# Patient Record
Sex: MALE | Race: WHITE | URBAN - METROPOLITAN AREA
[De-identification: names, ages, dates, MRNs, and addresses within clinical notes are randomized per-mention and may not be internally consistent; named-entity substitution may affect disease eponyms.]

---

## 2017-08-13 ENCOUNTER — HOSPITAL ENCOUNTER (EMERGENCY)
Facility: CLINIC | Age: 32
Discharge: HOME | End: 2017-08-13
Attending: NURSE PRACTITIONER | Admitting: NURSE PRACTITIONER

## 2017-08-13 VITALS
SYSTOLIC BLOOD PRESSURE: 142 MMHG | BODY MASS INDEX: 39.2 KG/M2 | OXYGEN SATURATION: 98 % | HEART RATE: 78 BPM | HEIGHT: 71 IN | DIASTOLIC BLOOD PRESSURE: 98 MMHG | WEIGHT: 280 LBS | RESPIRATION RATE: 16 BRPM | TEMPERATURE: 99.2 F

## 2017-08-13 DIAGNOSIS — T14.8XXA PUNCTURE WOUND: ICD-10-CM

## 2017-08-13 PROCEDURE — 99282 EMERGENCY DEPT VISIT SF MDM: CPT

## 2017-08-13 RX ORDER — GINSENG 100 MG
CAPSULE ORAL
Status: DISCONTINUED
Start: 2017-08-13 | End: 2017-08-13 | Stop reason: HOSPADM

## 2017-08-13 ASSESSMENT — ENCOUNTER SYMPTOMS: WOUND: 1

## 2017-08-13 NOTE — ED NOTES
Patient arrives for complaint of a wound to the right upper arm. Patient states he was wrestling with a friend last night at approx 0200 when he landed on a metal object sustaining his injury. States it has been bleeding a small amount since. ABC intact, A&Ox4. Bleeding controlled at this time.

## 2017-08-13 NOTE — ED AVS SNAPSHOT
[facility] Emergency Department    [address]    [facility] [address]    Phone:  [phone]    Fax:  [phone]                                       Marck Hadley   MRN: [id number]    Department:  [facility] Emergency Department   Date of Visit:  8/13/2017           Patient Information     Date Of Birth          1985        Your diagnoses for this visit were:     Puncture wound        You were seen by Natalie Roberts, GUICHO CNP.      Follow-up Information     Follow up with DoctorJai MD In 3 days.      Discharge References/Attachments     PUNCTURE WOUND (GENERAL) (ENGLISH)      24 Hour Appointment Hotline       To make an appointment at any [facility] clinic, call [phone] ([phone]). If you don't have a family doctor or clinic, we will help you find one. [facility] clinics are conveniently located to serve the needs of you and your family.             Review of your medicines      Notice     You have not been prescribed any medications.            Orders Needing Specimen Collection     None      Pending Results     No orders found from 8/11/2017 to 8/14/2017.            Pending Culture Results     No orders found from 8/11/2017 to 8/14/2017.            Pending Results Instructions     If you had any lab results that were not finalized at the time of your Discharge, you can call the ED Lab Result RN at [phone]. You will be contacted by this team for any positive Lab results or changes in treatment. The nurses are available 7 days a week from 10A to 6:30P.  You can leave a message 24 hours per day and they will return your call.        Test Results From Your Hospital Stay               Clinical Quality Measure: Blood Pressure Screening     Your blood pressure was checked while you were in the emergency department today. The last reading we obtained was  BP: (!) 142/98 . Please read the guidelines below about what these numbers mean and what  "you should do about them.  If your systolic blood pressure (the top number) is less than 120 and your diastolic blood pressure (the bottom number) is less than 80, then your blood pressure is normal. There is nothing more that you need to do about it.  If your systolic blood pressure (the top number) is 120-139 or your diastolic blood pressure (the bottom number) is 80-89, your blood pressure may be higher than it should be. You should have your blood pressure rechecked within a year by a primary care provider.  If your systolic blood pressure (the top number) is 140 or greater or your diastolic blood pressure (the bottom number) is 90 or greater, you may have high blood pressure. High blood pressure is treatable, but if left untreated over time it can put you at risk for heart attack, stroke, or kidney failure. You should have your blood pressure rechecked by a primary care provider within the next 4 weeks.  If your provider in the emergency department today gave you specific instructions to follow-up with your doctor or provider even sooner than that, you should follow that instruction and not wait for up to 4 weeks for your follow-up visit.        Thank you for choosing [facility]       Thank you for choosing [facility] for your care. Our goal is always to provide you with excellent care. Hearing back from our patients is one way we can continue to improve our services. Please take a few minutes to complete the written survey that you may receive in the mail after you visit with us. Thank you!        [employer] Information     [facility] lets you send messages to your doctor, view your test results, renew your prescriptions, schedule appointments and more. To sign up, go to [email or web address] . Click on \"Log in\" on the left side of the screen, which will take you to the Welcome page. Then click on \"Sign up Now\" on the right side of the page.     You will be asked to enter the access code listed below, as well as some " personal information. Please follow the directions to create your username and password.     Your access code is: [email or web address]  Expires: 2017  4:10 PM     Your access code will  in 90 days. If you need help or a new code, please call your [facility] clinic or [phone].        Care EveryWhere ID     This is your Care EveryWhere ID. This could be used by other organizations to access your [facility] medical records  [id number]        Equal Access to Services     SOCORRO PANCHAL : Brit rao Soradha, waaxjas luqadaha, qaybta kaalmada mary ellen, heber lopez . So [facility] [phone].    ATENCIÓN: Si habla español, tiene a del cid disposición servicios gratuitos de asistencia lingüística. Llame al [phone].    We comply with applicable federal civil rights laws and [place] laws. We do not discriminate on the basis of race, color, national origin, age, disability sex, sexual orientation or gender identity.            After Visit Summary       This is your record. Keep this with you and show to your community pharmacist(s) and doctor(s) at your next visit.

## 2017-08-13 NOTE — ED PROVIDER NOTES
"  History     Chief Complaint:  Puncture Wound    HPI   Marck Hadley is a 31 year old male who presents to the emergency department today for evaluation of puncture wound to his right upper arm. The patient was wrestling around 0200 this morning which resulted in a puncture wound caused by a chisel tool. He did clean the wound and has had it covered with bandages. He notes that the wound has not stopped bleeding over the last 12 hours. Not on any blood thinners. Tetanus is up to date.    Allergies:  Toradol  Tramadol      Medications:    The patient is currently on no regular medications.      Past Medical History:    History reviewed. No pertinent past medical history.     Past Surgical History:    Umbilical granuloma surgery    Family History:    History reviewed. No pertinent family history.      Social History:  The patient was accompanied to the ED alone.  Smoking Status: current, 0.50 ppd  Alcohol Use: yes   Marital Status:  Single [1]     Review of Systems   Skin: Positive for wound (puncture right upper arm).   All other systems reviewed and are negative.    Physical Exam     Patient Vitals for the past 24 hrs:   BP Temp Temp src Pulse Resp SpO2 Height Weight   08/13/17 1533 (!) 171/107 99.2  F (37.3  C) Temporal 100 16 98 % 1.803 m (5' 11\") 127 kg (280 lb)      Physical Exam  General: Appears stated age  HENT: Atraumatic.   Eyes: No scleral injection, conjunctivitis, or drainage.    Neck: Supple with normal ROM.   Cardio: Regular rate and rhythm, no murmurs, rubs, or gallops  Pulmonary/Chest: Clear to ausculation bilaterally.    Abdomen: Soft, non-tender, normal bowel sounds, no rebound or guarding  Musculoskeletal: Smal puncture wound to right upper arm, oozing blood, no redness.  Lymph: No anterior or posterior lymphadenopathy.   Neuro: Alert and oriented, no focal deficits noted.   Skin: Normal color and temperature, no rashes to visible exposed skin.   Psych: Mood and affect normal.  "     Emergency Department Course     Interventions:  Bacitracin 500 UNIT/GM ointment     Emergency Department Course:  Nursing notes and vitals reviewed.  1541 entered the room.  1542 I performed an exam of the patient as documented above.   The patient received the above intervention(s).    1615 the patient was rechecked. I discussed the treatment plan with the patient. They expressed understanding of this plan and consented to discharge. They will be discharged home with instructions for care and follow up. In addition, the patient will return to the emergency department if their symptoms persist, worsen, if new symptoms arise or if there is any concern.  All questions were answered.     Impression & Plan      Medical Decision Making:  Marck Hadley is a 31 year old male who presents for evaluation of a puncture wound to the right upper arm. The patient sustained this wound over 12 hours ago after wrestling that lead to a chisel tool puncturing his skin. No sutures were placed as the injury occurred outside of the 6-8 hour window for suturing. Based on appearance, will give bacitracin and have them see their doctor for recheck. Exam is benign at this point.  No signs of lymphadenitis, lymphangitis, necrotizing fascitis, osteomyelitis, abscess, cellulitis, etc. Patient provided return precautions.    Diagnosis:    ICD-10-CM   1. Puncture wound T14.8     Disposition:   The patient was discharged to home.    Scribe Disclosure:  I, Skinny Caruso, am serving as a scribe at 3:39 PM on 8/13/2017 to document services personally performed by Natalie Roberts APRN C*, based on my observations and the provider's statements to me.   [facility] EMERGENCY DEPARTMENT       Natalie Roberts APRN CNP  08/13/17 3789

## 2017-08-13 NOTE — ED AVS SNAPSHOT
[facility] Emergency Department    [address]    [facility] [address]    Phone:  [phone]    Fax:  [phone]                                       Marck Hadley   MRN: [id number]    Department:  [facility] Emergency Department   Date of Visit:  8/13/2017           After Visit Summary Signature Page     I have received my discharge instructions, and my questions have been answered. I have discussed any challenges I see with this plan with the nurse or doctor.    ..........................................................................................................................................  Patient/Patient Representative Signature      ..........................................................................................................................................  Patient Representative Print Name and Relationship to Patient    ..................................................               ................................................  Date                                            Time    ..........................................................................................................................................  Reviewed by Signature/Title    ...................................................              ..............................................  Date                                                            Time

## 2017-09-18 ENCOUNTER — HOSPITAL ENCOUNTER (EMERGENCY)
Facility: CLINIC | Age: 32
Discharge: HOME | End: 2017-09-18
Attending: EMERGENCY MEDICINE | Admitting: EMERGENCY MEDICINE

## 2017-09-18 ENCOUNTER — APPOINTMENT (OUTPATIENT)
Dept: GENERAL RADIOLOGY | Facility: CLINIC | Age: 32
End: 2017-09-18
Attending: EMERGENCY MEDICINE

## 2017-09-18 VITALS
DIASTOLIC BLOOD PRESSURE: 64 MMHG | OXYGEN SATURATION: 96 % | HEART RATE: 90 BPM | SYSTOLIC BLOOD PRESSURE: 122 MMHG | RESPIRATION RATE: 16 BRPM | TEMPERATURE: 99.7 F

## 2017-09-18 DIAGNOSIS — R50.9 FEVER, UNSPECIFIED: Primary | ICD-10-CM

## 2017-09-18 DIAGNOSIS — G44.219 EPISODIC TENSION-TYPE HEADACHE, NOT INTRACTABLE: ICD-10-CM

## 2017-09-18 LAB
ALBUMIN SERPL-MCNC: 3.5 G/DL (ref 3.4–5)
ALP SERPL-CCNC: 95 U/L (ref 40–150)
ALT SERPL W P-5'-P-CCNC: 43 U/L (ref 0–70)
ANION GAP SERPL CALCULATED.3IONS-SCNC: 6 MMOL/L (ref 3–14)
APPEARANCE CSF: CLEAR
AST SERPL W P-5'-P-CCNC: 50 U/L (ref 0–45)
BASOPHILS # BLD AUTO: 0 10E9/L (ref 0–0.2)
BASOPHILS NFR BLD AUTO: 0.4 %
BILIRUB SERPL-MCNC: 0.7 MG/DL (ref 0.2–1.3)
BUN SERPL-MCNC: 12 MG/DL (ref 7–30)
CALCIUM SERPL-MCNC: 8.2 MG/DL (ref 8.5–10.1)
CHLORIDE SERPL-SCNC: 102 MMOL/L (ref 94–109)
CO2 SERPL-SCNC: 24 MMOL/L (ref 20–32)
COLOR CSF: COLORLESS
CREAT SERPL-MCNC: 1.02 MG/DL (ref 0.66–1.25)
DIFFERENTIAL METHOD BLD: ABNORMAL
EOSINOPHIL # BLD AUTO: 0 10E9/L (ref 0–0.7)
EOSINOPHIL NFR BLD AUTO: 0 %
ERYTHROCYTE [DISTWIDTH] IN BLOOD BY AUTOMATED COUNT: 12.4 % (ref 10–15)
GFR SERPL CREATININE-BSD FRML MDRD: 86 ML/MIN/1.7M2
GLUCOSE CSF-MCNC: 70 MG/DL (ref 40–70)
GLUCOSE SERPL-MCNC: 133 MG/DL (ref 70–99)
GRAM STN SPEC: NORMAL
HCT VFR BLD AUTO: 51.5 % (ref 40–53)
HGB BLD-MCNC: 17.9 G/DL (ref 13.3–17.7)
IMM GRANULOCYTES # BLD: 0 10E9/L (ref 0–0.4)
IMM GRANULOCYTES NFR BLD: 0.4 %
LYMPH ABN NFR CSF MANUAL: 68 %
LYMPHOCYTES # BLD AUTO: 1.2 10E9/L (ref 0.8–5.3)
LYMPHOCYTES NFR BLD AUTO: 11.1 %
MCH RBC QN AUTO: 30.4 PG (ref 26.5–33)
MCHC RBC AUTO-ENTMCNC: 34.8 G/DL (ref 31.5–36.5)
MCV RBC AUTO: 87 FL (ref 78–100)
MONOCYTES # BLD AUTO: 0.8 10E9/L (ref 0–1.3)
MONOCYTES NFR BLD AUTO: 7.3 %
MONOS+MACROS NFR CSF MANUAL: 32 %
NEUTROPHILS # BLD AUTO: 8.7 10E9/L (ref 1.6–8.3)
NEUTROPHILS NFR BLD AUTO: 80.8 %
NRBC # BLD AUTO: 0 10*3/UL
NRBC BLD AUTO-RTO: 0 /100
PLATELET # BLD AUTO: 107 10E9/L (ref 150–450)
POTASSIUM SERPL-SCNC: 3.8 MMOL/L (ref 3.4–5.3)
PROT CSF-MCNC: 22 MG/DL (ref 15–60)
PROT SERPL-MCNC: 7.3 G/DL (ref 6.8–8.8)
RBC # BLD AUTO: 5.89 10E12/L (ref 4.4–5.9)
RBC # CSF MANUAL: 0 /UL (ref 0–2)
SODIUM SERPL-SCNC: 132 MMOL/L (ref 133–144)
SPECIMEN SOURCE: NORMAL
TUBE # CSF: 4 #
WBC # BLD AUTO: 10.8 10E9/L (ref 4–11)
WBC # CSF MANUAL: 7 /UL (ref 0–5)

## 2017-09-18 PROCEDURE — 96375 TX/PRO/DX INJ NEW DRUG ADDON: CPT

## 2017-09-18 PROCEDURE — 96374 THER/PROPH/DIAG INJ IV PUSH: CPT

## 2017-09-18 PROCEDURE — 89051 BODY FLUID CELL COUNT: CPT | Performed by: EMERGENCY MEDICINE

## 2017-09-18 PROCEDURE — 25000128 H RX IP 250 OP 636: Performed by: EMERGENCY MEDICINE

## 2017-09-18 PROCEDURE — 36415 COLL VENOUS BLD VENIPUNCTURE: CPT | Performed by: EMERGENCY MEDICINE

## 2017-09-18 PROCEDURE — 87205 SMEAR GRAM STAIN: CPT | Performed by: EMERGENCY MEDICINE

## 2017-09-18 PROCEDURE — 99285 EMERGENCY DEPT VISIT HI MDM: CPT | Mod: 25

## 2017-09-18 PROCEDURE — 96376 TX/PRO/DX INJ SAME DRUG ADON: CPT

## 2017-09-18 PROCEDURE — 82945 GLUCOSE OTHER FLUID: CPT | Performed by: EMERGENCY MEDICINE

## 2017-09-18 PROCEDURE — 84157 ASSAY OF PROTEIN OTHER: CPT | Performed by: EMERGENCY MEDICINE

## 2017-09-18 PROCEDURE — 87070 CULTURE OTHR SPECIMN AEROBIC: CPT | Performed by: EMERGENCY MEDICINE

## 2017-09-18 PROCEDURE — 62270 DX LMBR SPI PNXR: CPT

## 2017-09-18 PROCEDURE — 87798 DETECT AGENT NOS DNA AMP: CPT | Performed by: EMERGENCY MEDICINE

## 2017-09-18 PROCEDURE — 87529 HSV DNA AMP PROBE: CPT | Performed by: EMERGENCY MEDICINE

## 2017-09-18 PROCEDURE — 85025 COMPLETE CBC W/AUTO DIFF WBC: CPT | Performed by: EMERGENCY MEDICINE

## 2017-09-18 PROCEDURE — 80053 COMPREHEN METABOLIC PANEL: CPT | Performed by: EMERGENCY MEDICINE

## 2017-09-18 PROCEDURE — 71020 XR CHEST 2 VW: CPT

## 2017-09-18 PROCEDURE — 96361 HYDRATE IV INFUSION ADD-ON: CPT

## 2017-09-18 RX ORDER — LIDOCAINE HYDROCHLORIDE 10 MG/ML
INJECTION, SOLUTION INFILTRATION; PERINEURAL
Status: DISCONTINUED
Start: 2017-09-18 | End: 2017-09-18 | Stop reason: HOSPADM

## 2017-09-18 RX ORDER — DIPHENHYDRAMINE HYDROCHLORIDE 50 MG/ML
25 INJECTION INTRAMUSCULAR; INTRAVENOUS ONCE
Status: COMPLETED | OUTPATIENT
Start: 2017-09-18 | End: 2017-09-18

## 2017-09-18 RX ORDER — SODIUM CHLORIDE 9 MG/ML
1000 INJECTION, SOLUTION INTRAVENOUS CONTINUOUS
Status: DISCONTINUED | OUTPATIENT
Start: 2017-09-18 | End: 2017-09-18 | Stop reason: HOSPADM

## 2017-09-18 RX ORDER — ONDANSETRON 2 MG/ML
4 INJECTION INTRAMUSCULAR; INTRAVENOUS EVERY 30 MIN PRN
Status: DISCONTINUED | OUTPATIENT
Start: 2017-09-18 | End: 2017-09-18 | Stop reason: HOSPADM

## 2017-09-18 RX ADMIN — PROCHLORPERAZINE EDISYLATE 10 MG: 5 INJECTION INTRAMUSCULAR; INTRAVENOUS at 16:29

## 2017-09-18 RX ADMIN — DIPHENHYDRAMINE HYDROCHLORIDE 25 MG: 50 INJECTION, SOLUTION INTRAMUSCULAR; INTRAVENOUS at 16:28

## 2017-09-18 RX ADMIN — ONDANSETRON 4 MG: 2 INJECTION INTRAMUSCULAR; INTRAVENOUS at 14:35

## 2017-09-18 RX ADMIN — SODIUM CHLORIDE 1000 ML: 9 INJECTION, SOLUTION INTRAVENOUS at 14:16

## 2017-09-18 RX ADMIN — ONDANSETRON 4 MG: 2 INJECTION INTRAMUSCULAR; INTRAVENOUS at 16:03

## 2017-09-18 ASSESSMENT — ENCOUNTER SYMPTOMS
NUMBNESS: 0
TROUBLE SWALLOWING: 0
VOMITING: 0
FEVER: 1
WEAKNESS: 0
DIARRHEA: 0
SORE THROAT: 0
CHILLS: 1
ABDOMINAL PAIN: 0
HEADACHES: 1

## 2017-09-18 NOTE — DISCHARGE INSTRUCTIONS
Fever Control (Adult)  A fever is a normal reaction of your body to an illness. The temperature itself usually isn t harmful.  It actually helps your body fight infections. You don t need to treat a fever unless you feel very uncomfortable.   Home care  Follow these tips to take care of yourself at home:    If you feel warm, check your temperature.    Dress in light clothing. This will help you lose extra body heat through your skin. The fever will go up if you wear extra layers or wrap in blankets.    Fever causes your body to lose water through evaporation. Drink plenty of fluids. These include water, juice, clear sodas, ginger ale, or lemonade.  Fever medicines  You can take acetaminophen every 4 to 6 hours if:    You feel very uncomfortable    Your oral temperature is 100.4 F (38 C) or higher  If you can t take or keep down oral medicine, ask your pharmacist for acetaminophen suppositories. You don t need a prescription for these.  If the fever doesn t get better within 1 hour after you take acetaminophen, take ibuprofen. If this works, keep taking the ibuprofen every 6 to 8 hours.  Note: If you have chronic liver or kidney disease, talk with your healthcare provider before taking these medicines. Also talk with your provider if you ever had a stomach ulcer or GI (gastrointestinal) bleeding.  If either medicine alone doesn t keep the fever down, you may switch off between the 2 medicines every 3 to 4 hours. But do this only if your healthcare provider has told you to. For example, take ibuprofen. Wait 3 hours. Then take acetaminophen. Wait 3 hours. Take ibuprofen, and so on. Follow your provider s instructions exactly.  Note: Do not give aspirin to anyone younger than age 19 who is ill with a fever. Aspirin can cause serious side effects such as liver damage and Reye syndrome. Although rare, Reye syndrome is a very serious illness usually found in children younger than age 15. The syndrome is closely linked to  the use of aspirin or aspirin-containing medicine during viral infection.  Follow-up care  Follow up with your healthcare provider if you don't get better after 48 hours.  When to seek medical advice  Call your healthcare provider right away if any of these occur:    Fever, as directed by your healthcare provider, or:    Fever of 100.4 F (38 C) or above lasting for 24 to 48 hours    Fever lasting more than 3 days, even without other symptoms    Fever that happens after visiting a foreign country    Fever that happens within a month after visiting a country with malaria. Malaria is a serious illness. A fever can still be malaria even if you took medicine to prevent it. The medicine does not work in all cases.    Confusion or trouble thinking    Headache or stiff neck    Flat, small, purplish red spots on your skin    Low blood pressure    Fast heart rate    Fast (rapid) breathing    You are pregnant    You just had surgery, another medical procedure, or were just discharged from the hospital    Use of medicines that suppress the immune system (immunosuppressants). These include Prednisone, cancer medicines, and organ transplant rejection medicines. If you are not sure about whether your medicines suppress your immune system, ask your healthcare provider.  Call 911  Someone should call 911 if you:    Are having trouble breathing or shortness of breath    Are unresponsive  Important reminder  Call your healthcare provider if you get a fever after visiting a place where infectious diseases are common. Many people  a cold or other virus while traveling. This usually goes away without a problem. But, some places have more serious diseases. Fever with certain other symptoms may mean you have a serious illness. Symptoms to watch for include diarrhea, skin rashes, insect bites, and skin boils, or infections. Your provider may ask you:    What you did on your trip    How long you were there    Where you stayed (p[place],  native house, tent)    What you ate and drank    If you were bitten by insects or other bugs    If you swam in freshwater    If you had sex or got a tattoo or piercing while you were there  Check the Centers for Disease Control and Prevention to get more information about specific infectious diseases in the areas you have traveled.  Date Last Reviewed: 1/1/2017 2000-2017 [employer]. [address] [place] [address], [place] [address]. All rights reserved. This information is not intended as a substitute for professional medical care. Always follow your healthcare professional's instructions.       * HEADACHE [unspecified]    The cause of your headache today is not clear, but it does not appear to be the sign of any serious illness.  Under stress, some people tense the muscles of their shoulder, neck and scalp without knowing it. If this condition lasts long enough, a TENSION HEADACHE can occur.  A MIGRAINE HEADACHE is caused by changes in blood flow to the brain. It can be mild or severe. A migraine attack may be triggered by emotional stress, hormone changes during the menstrual cycle, oral contraceptives, alcohol use, certain foods containing tyramine, eye strain, weather changes, missing meals, lack of sleep or oversleeping.  Other causes of headache include a viral illness, sinus, ear or throat infection, dental pain and TMJ (jaw joint) pain.  HOME CARE:    If you were given pain medicine for this headache, do not drive yourself home. Arrange for a ride, instead. When you get home, try to sleep. You should feel much better when you wake up.    If you are having nausea or vomiting, follow a light diet until your headache is relieved.    If you have a migraine type headache, use sunglasses when in the daylight or around bright indoor lighting until symptoms improve. Bright glaring light can worsen this kind of headache.  FOLLOW UP with your doctor if the headache is not better within the next 24 hours. If  you have frequent headaches you should discuss a treatment plan with your primary care doctor. By being aware of the earliest signs of headache, and starting treatment right away, you may be able to stop the pain yourself.  GET PROMPT MEDICAL ATTENTION if any of the following occur:    Worsening of your head pain or no improvement within 24 hours    Repeated vomiting (unable to keep liquids down)    Fever over 101 F (38.3 C)    Stiff neck    Extreme drowsiness, confusion or fainting    Weakness of an arm or leg or one side of the face    Difficulty with speech or vision    3353-4405 [facility], [address] [place] [address]. All rights reserved. This information is not intended as a substitute for professional medical care. Always follow your healthcare professional's instructions.

## 2017-09-18 NOTE — ED AVS SNAPSHOT
[facility] Emergency Department    [address]    [facility] [address]    Phone:  [phone]    Fax:  [phone]                                       Marck Hadley   MRN: [id number]    Department:  [facility] Emergency Department   Date of Visit:  9/18/2017           After Visit Summary Signature Page     I have received my discharge instructions, and my questions have been answered. I have discussed any challenges I see with this plan with the nurse or doctor.    ..........................................................................................................................................  Patient/Patient Representative Signature      ..........................................................................................................................................  Patient Representative Print Name and Relationship to Patient    ..................................................               ................................................  Date                                            Time    ..........................................................................................................................................  Reviewed by Signature/Title    ...................................................              ..............................................  Date                                                            Time

## 2017-09-18 NOTE — ED NOTES
Sent from [facility] with headache, neck pain and fever since Friday. Denies migraine history.

## 2017-09-18 NOTE — ED PROVIDER NOTES
History     Chief Complaint:  Headache    HPI   Marck Hadley is a 31 year old male who presents with headache and fever. The patient states he has had this headache, along with some associated neck and back pain, fevers, and chills, for the last 3 days. He has been taking ibuprofen intermittently, last dose was at 10 am this morning.  His headache is located on both temples, and is a 6.5-7/10 on a pain scale. He has some decreased range of motion of the neck.  After being evaluated at [facility] the patient was referred here for further workup as he had fever and headache and they were concerned he would need an LP.  He was febrile there and given dose of tylenol.  The patient denies vomiting, diarrhea, abdominal pain, trouble swallowing, recent travel, weakness, numbness, sick contact, sore throat, and states no other concerns at this time.  Denies rash or tick exposures.     Allergies:  Toradol [Ketorolac]  Tramadol      Medications:    The patient is currently on no regular medications.     Past Medical History:    History reviewed.  No significant past medical history.    No history of brain tumors    Past Surgical History:    Umbilical granuloma   Tonsillectomy with adenoidectomy    Family History:    History reviewed. No pertinent family history.     Social History:  Marital Status:  Single   Smoking status: Current smoker  Alcohol use: Yes    Review of Systems   Constitutional: Positive for chills and fever.   HENT: Negative for sore throat and trouble swallowing.    Gastrointestinal: Negative for abdominal pain, diarrhea and vomiting.   Neurological: Positive for headaches. Negative for weakness and numbness.   All other systems reviewed and are negative.    Physical Exam     Patient Vitals for the past 24 hrs:   BP Temp Temp src Pulse Heart Rate Resp SpO2   09/18/17 1715 141/88 - - - - - 97 %   09/18/17 1700 154/88 - - - - - 93 %   09/18/17 1645 153/88 - - - - - 95 %   09/18/17 1630  136/68 - - - - - -   09/18/17 1615 125/75 - - - - - 100 %   09/18/17 1530 131/90 - - - - - 95 %   09/18/17 1515 (!) 109/96 - - - - - 100 %   09/18/17 1445 - - - 90 90 - 100 %   09/18/17 1314 132/80 99.7  F (37.6  C) Oral 111 - 16 97 %      Physical Exam  Constitutional: Alert, attentive, GCS 15, appears fatigued  HENT:    Nose: Nose normal.    Mouth/Throat: Oropharynx is clear, mucous membranes are moist, no peritonsillar abscess, no trismus, or voice changes   Eyes: Normal conjunctiva. Pupils are equal, round, and reactive to light.   Neck: full range of motion, no meningismus, paraspinal muscles tender to palpation   CV: tachycardic rate and regular rhythm; no murmurs, rubs or gallups  Chest: Effort normal and breath sounds normal. No wheezing, rales, or rhonchi.    GI:  There is no tenderness. No distension. Normal bowel sounds  MSK: Normal range of motion.   Neurological: Alert, attentive, oriented x4, strength and sensation intact  Skin: Skin is warm and dry, no rashes.     Emergency Department Course   Imaging:  Radiology findings were communicated with the patient who voiced understanding of the findings.  XR Chest 2 Views   Final Result   IMPRESSION:  Negative.       JACOB FENTON MD         Laboratory:  Laboratory findings were communicated with the patient who voiced understanding of the findings.  CMP: Glucose 133 (H),  (L), calcium 8.2 (L), AST 50 (H), o/w WNL (Creatinine 1.02)  CBC: HGB 17.9 (H),  (L), o/w WNL. (WBC 10.8)   Glucose CSF: Tube 1: 70  Protein total CSF: Tube 1: 22   Gram stain: negative   CSF Culture Aerobic Bacterial: Pending  Cell count with differential CSF: Tube 4: WBC 7 (H), o/w WNL   Varicella Zoster DNA: Pending  HSV types 1 and 2 qualitative PCR: Pending    Procedures:   Lumbar Puncture         INDICATION:  Headache, fever     CONSENT:  Risks (including but not limited to; infection, bleeding, spinal headache with possibility of spinal patch and temporary or permanent  neurologic injury), benefits and alternatives were discussed with patient and consent for procedure was obtained.    TIMEOUT:  Universal protocol was followed. TIME OUT conducted just prior to starting procedure confirmed patient identity, site/side, procedure, patient position, and availability of correct equipment and implants? Yes      MEDICATIONS: 5 cc's of 1% Lidocaine without epi: Local infiltration    PROCEDURAL NOTE:  Patient was placed in a sitting, supported by bedside stand position.  The low back was prepped with Betadine.  The patient was medicated as above.  A spinal needle was used to gain access to the subarachnoid space with stylet in place. Opening pressure was not measured cm H2O.  The fluid was clear.  Stylet was replaced and needle withdrawn.    PATIENT STATUS:  Patient tolerated the procedure poorly, patient had a near syncopal vasovagal episode during the LP, he was laid flat afterwards and given Zofran.  There were no complications.     Interventions:  1416 NS Bolus 1,000mL IV   1603: Zofran, 4 mg, IV    1628 Benadryl injection 25 mg IV  1629 Compazine injection 10 mg IV     Emergency Department Course:  Nursing notes and vitals reviewed.  I performed an exam of the patient as documented above.   The patient was sent for a chest x-ray while in the emergency department, results above.   IV was inserted and blood was drawn for laboratory testing, results above.   I performed the procedure as noted above.     1732: I spoke with Dr. Madsen, the patient's urgent care doctor, regarding patient's presentation, findings, and plan of care.   I discussed the treatment plan with the patient. They expressed understanding of this plan and consented to discharge. They will be discharged home with instructions for care and follow up. In addition, the patient will return to the emergency department if their symptoms persist, worsen, if new symptoms arise or if there is any concern.  All questions were  answered.    I personally reviewed the laboratory and imaging results with the Patient and answered all related questions prior to discharge.      Impression & Plan      Medical Decision Making:   Marck Hadley is a 31 year old male who presents with a headache and fever. This is of unclear source by history and no source is seen on detailed physical exam. The differential diagnosis of a fever is broad and includes more benign etiologies such as viral vs bacterial meningitis, URI, influenza, UTI, encephalitis, cellulitis, PTA, retropharyngeal abscess, Lymes disease, pneumonia.  Originally tachycardic (and febrile at OSH), but improved with IVF.  CXR is negative for pneumonia.  Urine culture from  is pending, but patient denies urinary symptoms.  Lumbar puncture performed and shows indeterminate WBC level of 7 with lymphocyte predominance.  CSF glucose, protein, and gram stain are negative.  He does not appear lethargic or confused, and is looking much better after the above interventions including tylenol from urgent care.  I think encephalitis is unlikely.  Viral meningitis is not completely ruled out, so I will add on CSF PCR for VZV and herpes zoster.  This could also be a nonspecific viral illness.  Patient feels well enough to go home and continue fever meds at home.  Based on his appearance and labs, I think it is reasonable to discharge patient home and will call him if cultures are positive.  Return precautions and primary care follow-up discussed with patient.      Diagnosis:    ICD-10-CM    1. Fever, unspecified R50.9 Comprehensive metabolic panel     Gram stain     Cell count with differential CSF: Tube 4   2. Episodic tension-type headache, not intractable G44.219       Disposition:   Discharged to home     Scribe Disclosure:  I, Joaquín Cutler, am serving as a scribe at 2:14 PM on 9/18/2017 to document services personally performed by Melissa Gold MD, based on my observations  and the provider's statements to me.   9/18/2017   [facility] EMERGENCY DEPARTMENT       Melissa Gold MD  09/18/17 2224

## 2017-09-18 NOTE — ED AVS SNAPSHOT
[facility]    [address]    [facility] [address]    Phone:  [phone]    Fax:  [phone]                                       Marck Hadley   MRN: [id number]    Department:  [facility]   Date of Visit:  9/18/2017           Patient Information     Date Of Birth          1985        Your diagnoses for this visit were:     Episodic tension-type headache, not intractable     Fever, unspecified        You were seen by Melissa Gold MD.      Follow-up Information     Schedule an appointment as soon as possible for a visit with [facility].    Why:  for follow-up of febrile illness     Contact information:    [address]  [address]  [facility] [address] [phone]        Go to [facility].    Specialty:  EMERGENCY MEDICINE    Why:  If symptoms worsen including seizures, lethargy, confusion, or severe headache    Contact information:    [address]  [facility] [address] [phone]        Discharge Instructions         Fever Control (Adult)  A fever is a normal reaction of your body to an illness. The temperature itself usually isn t harmful.  It actually helps your body fight infections. You don t need to treat a fever unless you feel very uncomfortable.   Home care  Follow these tips to take care of yourself at home:    If you feel warm, check your temperature.    Dress in light clothing. This will help you lose extra body heat through your skin. The fever will go up if you wear extra layers or wrap in blankets.    Fever causes your body to lose water through evaporation. Drink plenty of fluids. These include water, juice, clear sodas, ginger ale, or lemonade.  Fever medicines  You can take acetaminophen every 4 to 6 hours if:    You feel very uncomfortable    Your oral temperature is 100.4 F (38 C) or higher  If you  can t take or keep down oral medicine, ask your pharmacist for acetaminophen suppositories. You don t need a prescription for these.  If the fever doesn t get better within 1 hour after you take acetaminophen, take ibuprofen. If this works, keep taking the ibuprofen every 6 to 8 hours.  Note: If you have chronic liver or kidney disease, talk with your healthcare provider before taking these medicines. Also talk with your provider if you ever had a stomach ulcer or GI (gastrointestinal) bleeding.  If either medicine alone doesn t keep the fever down, you may switch off between the 2 medicines every 3 to 4 hours. But do this only if your healthcare provider has told you to. For example, take ibuprofen. Wait 3 hours. Then take acetaminophen. Wait 3 hours. Take ibuprofen, and so on. Follow your provider s instructions exactly.  Note: Do not give aspirin to anyone younger than age 19 who is ill with a fever. Aspirin can cause serious side effects such as liver damage and Reye syndrome. Although rare, Reye syndrome is a very serious illness usually found in children younger than age 15. The syndrome is closely linked to the use of aspirin or aspirin-containing medicine during viral infection.  Follow-up care  Follow up with your healthcare provider if you don't get better after 48 hours.  When to seek medical advice  Call your healthcare provider right away if any of these occur:    Fever, as directed by your healthcare provider, or:    Fever of 100.4 F (38 C) or above lasting for 24 to 48 hours    Fever lasting more than 3 days, even without other symptoms    Fever that happens after visiting a foreign country    Fever that happens within a month after visiting a country with malaria. Malaria is a serious illness. A fever can still be malaria even if you took medicine to prevent it. The medicine does not work in all cases.    Confusion or trouble thinking    Headache or stiff neck    Flat, small, purplish red spots on  your skin    Low blood pressure    Fast heart rate    Fast (rapid) breathing    You are pregnant    You just had surgery, another medical procedure, or were just discharged from the hospital    Use of medicines that suppress the immune system (immunosuppressants). These include Prednisone, cancer medicines, and organ transplant rejection medicines. If you are not sure about whether your medicines suppress your immune system, ask your healthcare provider.  Call 911  Someone should call 911 if you:    Are having trouble breathing or shortness of breath    Are unresponsive  Important reminder  Call your healthcare provider if you get a fever after visiting a place where infectious diseases are common. Many people  a cold or other virus while traveling. This usually goes away without a problem. But, some places have more serious diseases. Fever with certain other symptoms may mean you have a serious illness. Symptoms to watch for include diarrhea, skin rashes, insect bites, and skin boils, or infections. Your provider may ask you:    What you did on your trip    How long you were there    Where you stayed (hotel, native house, tent)    What you ate and drank    If you were bitten by insects or other bugs    If you swam in freshwater    If you had sex or got a tattoo or piercing while you were there  Check the Centers for Disease Control and Prevention to get more information about specific infectious diseases in the areas you have traveled.  Date Last Reviewed: 1/1/2017 2000-2017 [employer]. [address] [place] [address], [place] [address]. All rights reserved. This information is not intended as a substitute for professional medical care. Always follow your healthcare professional's instructions.       * HEADACHE [unspecified]    The cause of your headache today is not clear, but it does not appear to be the sign of any serious illness.  Under stress, some people tense the muscles of their shoulder,  neck and scalp without knowing it. If this condition lasts long enough, a TENSION HEADACHE can occur.  A MIGRAINE HEADACHE is caused by changes in blood flow to the brain. It can be mild or severe. A migraine attack may be triggered by emotional stress, hormone changes during the menstrual cycle, oral contraceptives, alcohol use, certain foods containing tyramine, eye strain, weather changes, missing meals, lack of sleep or oversleeping.  Other causes of headache include a viral illness, sinus, ear or throat infection, dental pain and TMJ (jaw joint) pain.  HOME CARE:    If you were given pain medicine for this headache, do not drive yourself home. Arrange for a ride, instead. When you get home, try to sleep. You should feel much better when you wake up.    If you are having nausea or vomiting, follow a light diet until your headache is relieved.    If you have a migraine type headache, use sunglasses when in the daylight or around bright indoor lighting until symptoms improve. Bright glaring light can worsen this kind of headache.  FOLLOW UP with your doctor if the headache is not better within the next 24 hours. If you have frequent headaches you should discuss a treatment plan with your primary care doctor. By being aware of the earliest signs of headache, and starting treatment right away, you may be able to stop the pain yourself.  GET PROMPT MEDICAL ATTENTION if any of the following occur:    Worsening of your head pain or no improvement within 24 hours    Repeated vomiting (unable to keep liquids down)    Fever over 101 F (38.3 C)    Stiff neck    Extreme drowsiness, confusion or fainting    Weakness of an arm or leg or one side of the face    Difficulty with speech or vision    7929-8995 [facility], [address] [place] [address], [place] [address]. All rights reserved. This information is not intended as a substitute for professional medical care. Always follow your healthcare professional's  instructions.        24 Hour Appointment Hotline       To make an appointment at any [facility], call [phone] ([phone]). If you don't have a family doctor or clinic, we will help you find one. [facility] are conveniently located to serve the needs of you and your family.             Review of your medicines      Notice     You have not been prescribed any medications.            Procedures and tests performed during your visit     CBC with platelets differential    CSF Culture Aerobic Bacterial    Cell count with differential CSF: Tube 4    Comprehensive metabolic panel    Glucose CSF: Tube 1    Gram stain    Protein total CSF: Tube 1    XR Chest 2 Views      Orders Needing Specimen Collection     None      Pending Results     Date and Time Order Name Status Description    9/18/2017 1544 CSF Culture Aerobic Bacterial In process     9/18/2017 1544 Gram stain Preliminary             Pending Culture Results     Date and Time Order Name Status Description    9/18/2017 1544 CSF Culture Aerobic Bacterial In process     9/18/2017 1544 Gram stain Preliminary             Pending Results Instructions     If you had any lab results that were not finalized at the time of your Discharge, you can call the ED Lab Result RN at [phone]. You will be contacted by this team for any positive Lab results or changes in treatment. The nurses are available 7 days a week from 10A to 6:30P.  You can leave a message 24 hours per day and they will return your call.        Test Results From Your Hospital Stay        9/18/2017  3:35 PM      Component Results     Component Value Ref Range & Units Status    WBC 10.8 4.0 - 11.0 10e9/L Final    RBC Count 5.89 4.4 - 5.9 10e12/L Final    Hemoglobin 17.9 (H) 13.3 - 17.7 g/dL Final    Hematocrit 51.5 40.0 - 53.0 % Final    MCV 87 78 - 100 fl Final    MCH 30.4 26.5 - 33.0 pg Final    MCHC 34.8 31.5 - 36.5 g/dL Final    RDW 12.4 10.0 - 15.0 % Final    Platelet Count 107 (L)  150 - 450 10e9/L Final    Diff Method Automated Method  Final    % Neutrophils 80.8 % Final    % Lymphocytes 11.1 % Final    % Monocytes 7.3 % Final    % Eosinophils 0.0 % Final    % Basophils 0.4 % Final    % Immature Granulocytes 0.4 % Final    Nucleated RBCs 0 0 /100 Final    Absolute Neutrophil 8.7 (H) 1.6 - 8.3 10e9/L Final    Absolute Lymphocytes 1.2 0.8 - 5.3 10e9/L Final    Absolute Monocytes 0.8 0.0 - 1.3 10e9/L Final    Absolute Eosinophils 0.0 0.0 - 0.7 10e9/L Final    Absolute Basophils 0.0 0.0 - 0.2 10e9/L Final    Abs Immature Granulocytes 0.0 0 - 0.4 10e9/L Final    Absolute Nucleated RBC 0.0  Final               9/18/2017  3:34 PM      Narrative     CHEST TWO VIEWS  9/18/2017 3:13 PM    HISTORY:  Fever, cough, evaluate for pneumonia.    COMPARISON:  None.        Impression     IMPRESSION:  Negative.     JACOB FENTON MD         9/18/2017  5:23 PM      Component Results     Component Value Ref Range & Units Status    Sodium 132 (L) 133 - 144 mmol/L Final    Potassium 3.8 3.4 - 5.3 mmol/L Final    Chloride 102 94 - 109 mmol/L Final    Carbon Dioxide 24 20 - 32 mmol/L Final    Anion Gap 6 3 - 14 mmol/L Final    Glucose 133 (H) 70 - 99 mg/dL Final    Urea Nitrogen 12 7 - 30 mg/dL Final    Creatinine 1.02 0.66 - 1.25 mg/dL Final    GFR Estimate 86 >60 mL/min/1.7m2 Final    Non  GFR Calc    GFR Estimate If Black >90 >60 mL/min/1.7m2 Final    African American GFR Calc    Calcium 8.2 (L) 8.5 - 10.1 mg/dL Final    Bilirubin Total 0.7 0.2 - 1.3 mg/dL Final    Albumin 3.5 3.4 - 5.0 g/dL Final    Protein Total 7.3 6.8 - 8.8 g/dL Final    Alkaline Phosphatase 95 40 - 150 U/L Final    ALT 43 0 - 70 U/L Final    AST 50 (H) 0 - 45 U/L Final         9/18/2017  4:46 PM      Component Results     Component Value Ref Range & Units Status    Glucose CSF 70 40 - 70 mg/dL Final    CSF glucose concentrations are about 60 percent of normal plasma glucose.         9/18/2017  4:46 PM      Component Results      Component Value Ref Range & Units Status    Protein Total CSF 22 15 - 60 mg/dL Final         9/18/2017  5:27 PM      Component Results     Component    Specimen Description    Gram Stain    No organisms seen    Gram Stain    Gram stain result is preliminary and awaits review of Microbiology Staff.    Gram Stain    Preliminary Gram stain report called to and read back by    Gram Stain    (Note)  BRIAN HEARD IN [facility] ON 09.18.2017 AT 1725 BY VL             9/18/2017  4:22 PM         9/18/2017  5:23 PM      Component Results     Component Value Ref Range & Units Status    WBC CSF 7 (H) 0 - 5 /uL Final    RBC CSF 0 0 - 2 /uL Final    % Lymphocytes CSF 68 % Final    % Mono/Macros CSF 32 % Final    Tube Number 4 # Final    Color CSF Colorless CLRL^Colorless Final    Appearance CSF Clear CLER^Clear Final                Clinical Quality Measure: Blood Pressure Screening     Your blood pressure was checked while you were in the emergency department today. The last reading we obtained was  BP: 122/64 . Please read the guidelines below about what these numbers mean and what you should do about them.  If your systolic blood pressure (the top number) is less than 120 and your diastolic blood pressure (the bottom number) is less than 80, then your blood pressure is normal. There is nothing more that you need to do about it.  If your systolic blood pressure (the top number) is 120-139 or your diastolic blood pressure (the bottom number) is 80-89, your blood pressure may be higher than it should be. You should have your blood pressure rechecked within a year by a primary care provider.  If your systolic blood pressure (the top number) is 140 or greater or your diastolic blood pressure (the bottom number) is 90 or greater, you may have high blood pressure. High blood pressure is treatable, but if left untreated over time it can put you at risk for heart attack, stroke, or kidney failure. You should have your blood pressure rechecked  "by a primary care provider within the next 4 weeks.  If your provider in the emergency department today gave you specific instructions to follow-up with your doctor or provider even sooner than that, you should follow that instruction and not wait for up to 4 weeks for your follow-up visit.        Thank you for choosing [facility]       Thank you for choosing [facility] for your care. Our goal is always to provide you with excellent care. Hearing back from our patients is one way we can continue to improve our services. Please take a few minutes to complete the written survey that you may receive in the mail after you visit with us. Thank you!        [facility] Information     [facility] lets you send messages to your doctor, view your test results, renew your prescriptions, schedule appointments and more. To sign up, go to [email or web address] . Click on \"Log in\" on the left side of the screen, which will take you to the Welcome page. Then click on \"Sign up Now\" on the right side of the page.     You will be asked to enter the access code listed below, as well as some personal information. Please follow the directions to create your username and password.     Your access code is: [email or web address]  Expires: 2017  4:10 PM     Your access code will  in 90 days. If you need help or a new code, please call your [facility] clinic or [phone].        Care EveryWhere ID     This is your Care EveryWhere ID. This could be used by other organizations to access your [facility] medical records  [id number]        Equal Access to Services     EMILY PANCHAL : Hadii brennen patiñoo Soradha, waaxda luqadaha, qaybta kaalmada mary ellen, heber lopez . So [facility] [phone].    ATENCIÓN: Si habla español, tiene a del cid disposición servicios gratuitos de asistencia lingüística. Llame al [phone].    We comply with applicable federal civil rights laws and [place] laws. We do not discriminate on the basis " of race, color, national origin, age, disability sex, sexual orientation or gender identity.            After Visit Summary       This is your record. Keep this with you and show to your community pharmacist(s) and doctor(s) at your next visit.

## 2017-09-19 LAB
HSV1 DNA CSF QL NAA+PROBE: NOT DETECTED
HSV2 DNA CSF QL NAA+PROBE: NOT DETECTED
MICROBIOLOGIST REVIEW: NORMAL

## 2017-09-20 LAB
SPECIMEN TYPE: NORMAL
VARICELLA ZOSTER DNA PCR COMMENT: NORMAL
VZV DNA SPEC QL NAA+PROBE: NORMAL

## 2017-09-23 LAB
BACTERIA SPEC CULT: NO GROWTH
SPECIMEN SOURCE: NORMAL

## 2019-11-04 ENCOUNTER — HEALTH MAINTENANCE LETTER (OUTPATIENT)
Age: 34
End: 2019-11-04

## 2020-11-22 ENCOUNTER — HEALTH MAINTENANCE LETTER (OUTPATIENT)
Age: 35
End: 2020-11-22

## 2021-09-19 ENCOUNTER — HEALTH MAINTENANCE LETTER (OUTPATIENT)
Age: 36
End: 2021-09-19

## 2022-01-08 ENCOUNTER — HEALTH MAINTENANCE LETTER (OUTPATIENT)
Age: 37
End: 2022-01-08

## 2022-11-20 ENCOUNTER — HEALTH MAINTENANCE LETTER (OUTPATIENT)
Age: 37
End: 2022-11-20

## 2023-04-15 ENCOUNTER — HEALTH MAINTENANCE LETTER (OUTPATIENT)
Age: 38
End: 2023-04-15